# Patient Record
Sex: MALE | Race: WHITE | HISPANIC OR LATINO | Employment: FULL TIME | URBAN - METROPOLITAN AREA
[De-identification: names, ages, dates, MRNs, and addresses within clinical notes are randomized per-mention and may not be internally consistent; named-entity substitution may affect disease eponyms.]

---

## 2020-08-20 ENCOUNTER — HOSPITAL ENCOUNTER (EMERGENCY)
Facility: HOSPITAL | Age: 40
Discharge: HOME | End: 2020-08-20
Attending: EMERGENCY MEDICINE
Payer: OTHER GOVERNMENT

## 2020-08-20 VITALS
TEMPERATURE: 98 F | OXYGEN SATURATION: 99 % | SYSTOLIC BLOOD PRESSURE: 150 MMHG | DIASTOLIC BLOOD PRESSURE: 98 MMHG | RESPIRATION RATE: 20 BRPM | HEART RATE: 62 BPM

## 2020-08-20 DIAGNOSIS — J34.89 NASAL CONGESTION WITH RHINORRHEA: ICD-10-CM

## 2020-08-20 DIAGNOSIS — R09.81 NASAL CONGESTION WITH RHINORRHEA: ICD-10-CM

## 2020-08-20 DIAGNOSIS — J06.9 VIRAL UPPER RESPIRATORY TRACT INFECTION WITH COUGH: ICD-10-CM

## 2020-08-20 DIAGNOSIS — U07.1 COVID-19 VIRUS DETECTED: ICD-10-CM

## 2020-08-20 DIAGNOSIS — U07.1 COVID-19 VIRUS INFECTION: Primary | ICD-10-CM

## 2020-08-20 DIAGNOSIS — Z20.822 SUSPECTED COVID-19 VIRUS INFECTION: ICD-10-CM

## 2020-08-20 LAB — SARS-COV-2 RNA RESP QL NAA+PROBE: DETECTED

## 2020-08-20 PROCEDURE — 99282 EMERGENCY DEPT VISIT SF MDM: CPT

## 2020-08-20 PROCEDURE — U0003 INFECTIOUS AGENT DETECTION BY NUCLEIC ACID (DNA OR RNA); SEVERE ACUTE RESPIRATORY SYNDROME CORONAVIRUS 2 (SARS-COV-2) (CORONAVIRUS DISEASE [COVID-19]), AMPLIFIED PROBE TECHNIQUE, MAKING USE OF HIGH THROUGHPUT TECHNOLOGIES AS DESCRIBED BY CMS-2020-01-R: HCPCS

## 2020-08-20 PROCEDURE — 99284 EMERGENCY DEPT VISIT MOD MDM: CPT | Mod: ,,, | Performed by: EMERGENCY MEDICINE

## 2020-08-20 PROCEDURE — 99284 PR EMERGENCY DEPT VISIT,LEVEL IV: ICD-10-PCS | Mod: ,,, | Performed by: EMERGENCY MEDICINE

## 2020-08-20 RX ORDER — IRBESARTAN 75 MG/1
75 TABLET ORAL NIGHTLY
COMMUNITY

## 2020-08-20 NOTE — Clinical Note
"Levy "Elizabeth Gallego was seen and treated in our emergency department on 8/20/2020.     COVID-19 is present in our communities across the state. There is limited testing for COVID at this time, so not all patients can be tested. In this situation, your employee meets the following criteria:    Levy Gallego has met the criteria for COVID-19 testing based upon symptoms, travel, and/or potential exposure. The test has been completed and is pending results at this time. During this time the employee is not able to work and should be quarantined per the Centers for Disease Control timelines.     If you have any questions or concerns, or if I can be of further assistance, please do not hesitate to contact me.    Sincerely,             Elmer Ashley MD"

## 2020-08-20 NOTE — ED PROVIDER NOTES
Encounter Date: 8/20/2020       History     Chief Complaint   Patient presents with    COVID-19 Concerns     cough, fever, body aches     HPI   40 Y/O  Malay-speaking M with recent exposure to COVID-19 C/O ~2 days of generalized malaise, nasal congestion and cough productive of clear sputum with no associated HA, dizziness, neck pain/stiffnes, dyspnea or MAGUIRE. Nasal congestion with non-purulent discharge and pressure to maxillary and frontal region.  No reported loss of taste and scent. Despite sore throat, no change in voice, drooling or dysphagia to solids or liquids is reported. No hemoptysis, sick contacts, recent travel and no Hx of TB or exposure to TB. No N/V, abd/back/chest pain. Otherwise, no change in PO intake and no diarrhea/change in BMs.    Review of patient's allergies indicates:  No Known Allergies  Past Medical History:   Diagnosis Date    Hypertension      History reviewed. No pertinent surgical history.  History reviewed. No pertinent family history.  Social History     Tobacco Use    Smoking status: Never Smoker    Smokeless tobacco: Never Used   Substance Use Topics    Alcohol use: Not on file    Drug use: Not on file     Review of Systems  HEENT: + nasal congestion w/non-purulent rhinorrhea; + loss of taste and smell; No Sore throat, ear pain, headache, blurry vision or change in vision, eye pain, otorrhea, tooth pain, swelling, or voice changes.  NECK: No pain or stiffness, masses, trauma, or redness.  HEART: No pain, palpitations, diaphoresis, nausea, vomiting, or radiation of any pain symptoms.  LUNG: + Cough, but no SOB, MAGUIRE or other complaints.  ABDOMEN: No pain, nausea, vomiting, diarrhea, constipation, or flank pain.  : No discharge, dysuria, urgency, hesitancy, frequency, lesions, rashes, masses, or sores.  EXTREMITIES: FROM and no swelling, redness, trauma, lesions, sores, weakness, numbness, or tingling.  SKIN: No lesions, rashes, trauma or other complaints.  NEURO: No  dizziness, weakness, fatigue, tremors, nystagmus, headache, change in vision or disturbances of balance or coordination.    Physical Exam     Initial Vitals [08/20/20 1234]   BP Pulse Resp Temp SpO2   (!) 150/98 62 20 97.6 °F (36.4 °C) 99 %      MAP       --         Physical Exam  CONSTITUTIONAL: Calm. Cooperative. Non-toxic appearance. Well-developed and nourished. Sitting on chair/stretcher in no acute distress.  HEAD: NC/AT.  MOUTH/THROAT: Speaking Full Sentences with no drooling, hoarseness/muffled voice.   EYES: Anicteric, but + Conjunctival Hyperemia.   NECK: Full passive range of motion without pain and phonation normal. No stridor.  HEART: Tachy Rate with Reg Rhythm; normal pulses.  PULMONARY/CHEST: No Tachypnea, Effort normal and breath sounds normal. No accessory muscle usage. No respiratory distress. Lungs CTA B/L with No W/R/R.  ABDOMEN: +BS, Soft. ND. No TTP.  MUSCULOSKELETAL: FROM.  NEURO: AAOx3. Answering Questions Appropriately.  SKIN: Skin is warm, dry and intact. No rash noted.    ED Course   Procedures  Labs Reviewed   SARS-COV-2 (COVID-19) QUALITATIVE PCR - Abnormal; Notable for the following components:       Result Value    SARS-CoV2 (COVID-19) Qualitative PCR Detected (*)     All other components within normal limits    Narrative:     Is the patient symptomatic?->Yes  Is this needed for pre-procedure or pre-op testing?->No          Imaging Results    None          Medical Decision Making:   History:   Old Medical Records: I decided to obtain old medical records.  Initial Assessment:    Afebrile, atraumatic, hemodynamically stable and well-appearing male with no appreciated airway respiratory stability presents with signs and symptoms of viral upper respiratory tract infection.  No hypoxia appreciated.  COVID test supportive outpatient treatment recommended.  ____________________  Bronson Ashley MD, FAAEM  Emergency Medicine Staff  2:16 PM 8/20/2020      Clinical Tests:   Lab Tests:  Ordered  Radiological Study: Ordered  Medical Tests: Ordered                                 Clinical Impression:       ICD-10-CM ICD-9-CM   1. COVID-19 virus infection  U07.1    2. Suspected Covid-19 Virus Infection  R68.89    3. Nasal congestion with rhinorrhea  J34.89 478.19   4. Viral upper respiratory tract infection with cough  J06.9 465.9    B97.89          Disposition:   Disposition: Discharged  Condition: Stable     ED Disposition Condition    Discharge Stable        ED Prescriptions     None        Follow-up Information     Follow up With Specialties Details Why Contact Info    [facility] Emergency Medicine Schedule an appointment as soon as possible for a visit   [address] [place] [id number] [phone]                      Patient abandon our care prior to ultimate disposition prior to I being able to provide his paperwork and/or final COVID positive results.  The reason he actually remained in the emergency department is his because he was having trouble with the cell phone.  I was unable to return item number he a provided.  ____________________  Bronson Ashley MD, [facility]  Emergency Medicine Staff  7:49 AM 8/21/2020                 Elmer Ashley MD  08/21/20 0749